# Patient Record
Sex: MALE | Race: WHITE | NOT HISPANIC OR LATINO | URBAN - METROPOLITAN AREA
[De-identification: names, ages, dates, MRNs, and addresses within clinical notes are randomized per-mention and may not be internally consistent; named-entity substitution may affect disease eponyms.]

---

## 2018-04-24 ENCOUNTER — OFFICE VISIT (OUTPATIENT)
Dept: ORTHOPEDIC SURGERY | Facility: CLINIC | Age: 43
End: 2018-04-24

## 2018-04-24 VITALS — TEMPERATURE: 98.2 F | BODY MASS INDEX: 35.78 KG/M2 | HEIGHT: 73 IN | WEIGHT: 270 LBS

## 2018-04-24 DIAGNOSIS — M1A.9XX0 CHRONIC GOUT INVOLVING TOE OF RIGHT FOOT WITHOUT TOPHUS, UNSPECIFIED CAUSE: ICD-10-CM

## 2018-04-24 DIAGNOSIS — M79.671 FOOT PAIN, RIGHT: Primary | ICD-10-CM

## 2018-04-24 DIAGNOSIS — M79.89 FOOT SWELLING: ICD-10-CM

## 2018-04-24 PROCEDURE — 99203 OFFICE O/P NEW LOW 30 MIN: CPT | Performed by: ORTHOPAEDIC SURGERY

## 2018-04-24 PROCEDURE — 73630 X-RAY EXAM OF FOOT: CPT | Performed by: ORTHOPAEDIC SURGERY

## 2018-04-24 NOTE — PROGRESS NOTES
New Right foot      Patient: Gianni Peters        YOB: 1975        Chief Complaints:right foot pain  Chief Complaint   Patient presents with   • Right Foot - Pain, Establish Care         History of Present Illness:   This is a 43-year-old male presents complaining of right foot pain he states 2 years ago had a work injury were pouch at ran over his toes hyper extending his toes he states that did resolve however periodically he will get significant amount of swelling at the first MTP joint happens about every 6 months to respond to steroid states he return one month sig month ago but has resolved.  He cannot determine what brings this on his symptoms when it occurs are moderate stabbing aching he is a  past medical history is remarkable for psoriasis and testicular cancer  HPI      Allergies: No Known Allergies    Medications:   Home Medications:  No current outpatient prescriptions on file prior to visit.     No current facility-administered medications on file prior to visit.      Current Medications:  Scheduled Meds:  Continuous Infusions:  No current facility-administered medications for this visit.   PRN Meds:.    Past Medical History:   Diagnosis Date   • Psoriasis       History reviewed. No pertinent surgical history.     Social History     Occupational History   • Not on file.     Social History Main Topics   • Smoking status: Current Every Day Smoker   • Smokeless tobacco: Not on file   • Alcohol use No   • Drug use: Unknown   • Sexual activity: Not on file    Social History     Social History Narrative   • No narrative on file      History reviewed. No pertinent family history.          Review of Systems: 14 point review of systems are remarkable for the foot pain only the remainder are negative per the patient    Review of Systems      Physical Exam: 43 y.o. male  General Appearance:    Alert, cooperative, in no acute distress                 Vitals:    04/24/18 1609   Temp: 98.2 °F  "(36.8 °C)   Weight: 122 kg (270 lb)   Height: 185.4 cm (73\")      Patient is alert and read ×3 no acute distress appears her above-listed at height weight and age.  Affect is normal respiratory rate is normal unlabored. Heart rate regular rate rhythm, sclera, dentition and hearing are normal for the purpose of this exam.        Ortho Exam   physical exam of his right foot he does have psoriasis on the right foot he has no palpable tenderness at his first MTP joint he does have some change in contour of the joint but no obvious swelling he can extend and flex his toe 2+ distal pulses ankle exam is normal         Radiology:   AP, Lateral  And oblique of the right foot  ordered/reviewed to evaluate pain.I have no films for comparison.  These show some degenerative changes seen the first MTP joint that are what I would consider mild no other acute bony pathology        Assessment/Plan:  Recurrent right toe swelling I do think this is gout he responds well to steroids I told him this really needs to be treated medically he needs to see his primary care.  He does not have one we talked about ways to get a primary care total nurse her medications that they can treat him with that we'll decrease the recurrence of this                        "

## 2024-06-03 ENCOUNTER — TELEPHONE (OUTPATIENT)
Dept: ORTHOPEDIC SURGERY | Facility: CLINIC | Age: 49
End: 2024-06-03

## 2024-06-03 ENCOUNTER — OFFICE VISIT (OUTPATIENT)
Dept: ORTHOPEDIC SURGERY | Facility: CLINIC | Age: 49
End: 2024-06-03
Payer: COMMERCIAL

## 2024-06-03 VITALS — WEIGHT: 245.6 LBS | HEIGHT: 73 IN | BODY MASS INDEX: 32.55 KG/M2 | TEMPERATURE: 97.5 F

## 2024-06-03 DIAGNOSIS — R52 PAIN: ICD-10-CM

## 2024-06-03 DIAGNOSIS — S86.112A RUPTURE OF LEFT GASTROCNEMIUS TENDON, INITIAL ENCOUNTER: Primary | ICD-10-CM

## 2024-06-03 PROCEDURE — 99204 OFFICE O/P NEW MOD 45 MIN: CPT | Performed by: ORTHOPAEDIC SURGERY

## 2024-06-03 PROCEDURE — 73590 X-RAY EXAM OF LOWER LEG: CPT | Performed by: ORTHOPAEDIC SURGERY

## 2024-06-03 RX ORDER — FAMOTIDINE 20 MG/1
20 TABLET, FILM COATED ORAL AS NEEDED
COMMUNITY

## 2024-06-03 NOTE — TELEPHONE ENCOUNTER
THIS IS A FRIEND OF MINE. HE WAS CLIMBING A LADDER AND FELT A POP AND SEVER PAIN IN HIS CALF. HE IS HAVING A HARD TIME WALKING. IS THIS SOMETHING YOU SEE?

## 2024-06-03 NOTE — PROGRESS NOTES
"   New Patient Complaint      Patient: Gianni Peters  YOB: 1975 49 y.o. male  Medical Record Number: [id number]    Chief Complaints: I calf hurts    History of Present Illness:   Patient reports pain over the medial aspect of his left calf that began on 6/1/2024 while climbing a ladder.  He said it felt like as he had been hit with a bat.  He had pain with weightbearing with pain and swelling over the medial aspect of the calf since that time.  He has not had pain in the Achilles.       HPI    Allergies: No Known Allergies    Medications:   Current Outpatient Medications on File Prior to Visit   Medication Sig    famotidine (PEPCID) 20 MG tablet Take 1 tablet by mouth As Needed for Heartburn.    RaNITidine HCl (ZANTAC PO) Take  by mouth. (Patient not taking: Reported on 6/3/2024)     No current facility-administered medications on file prior to visit.       Past Medical History:   Diagnosis Date    Psoriasis      No past surgical history on file.  Social History     Occupational History    Not on file   Tobacco Use    Smoking status: Every Day    Smokeless tobacco: Not on file   Substance and Sexual Activity    Alcohol use: No    Drug use: Not on file    Sexual activity: Not on file      Social History     Social History Narrative    Not on file     History reviewed. No pertinent family history.    Review of Systems: 14 point review of systems performed, positive pertinent findings identified in HPI. All remaining systems negative     Review of Systems      Physical Exam:   Vitals:    06/03/24 1314   Temp: 97.5 °F (36.4 °C)   Weight: 111 kg (245 lb 9.6 oz)   Height: 185.4 cm (73\")   PainSc:   5     Physical Exam   Constitutional: pleasant, well developed   Eyes: sclera non icteric  Hearing : adequate for exam  Cardiovascular: palpable pulses in left foot, left calf/ thigh NT without sign of DVT  Respiratoy: breathing unlabored   Neurological: grossly sensate to LT throughout left LE  Psychiatric: " oriented with normal mood and affect.   Lymphatic: No palpable popliteal lymphadenopathy left LE  Skin: intact throughout left leg/foot  Musculoskeletal: Exam he has mild to moderate swelling about the left medial calf with tenderness palpation no palpable fascial defect.  He had good plantarflexion strength but with some discomfort at the calf but no pain or palpable defect at the Achilles.  I was able to achieve neutral dorsiflexion  Physical Exam  Ortho Exam    Radiology: 2 views left tib-fib ordered evaluate pain reviewed and no prior x-rays available for comparison I do not see any obvious fracture in the distal portion of the knee and most proximal portion of the ankle is partially visualized with no obvious fractures.    Assessment/Plan: Left calf pain consistent with medial gastroc rupture    Reviewed with him I am not recommending any further workup or surgical treatment at this time.    He was fitted with a boot with a double heel lift and may weight-bear as tolerated on this but recommended offloading at least with crutches for a week or so.  In 1 week to 10 days he can start removing one of the heel lifts and begin some gentle heel cord stretching exercises.  In the interim he will do some towel stretch prior to first up in the morning and will keep him off work where he works as a  and has to get out of the truck and climb up over to place a tarp intermittently..  He was prescribed compounding cream to apply the area several times daily    I will see him back in

## 2024-06-06 ENCOUNTER — PATIENT ROUNDING (BHMG ONLY) (OUTPATIENT)
Dept: ORTHOPEDIC SURGERY | Facility: CLINIC | Age: 49
End: 2024-06-06
Payer: COMMERCIAL

## 2024-06-06 NOTE — PROGRESS NOTES
A [facility] Message has been sent to the patient [place] [facility] [place]

## 2024-06-19 ENCOUNTER — OFFICE VISIT (OUTPATIENT)
Dept: ORTHOPEDIC SURGERY | Facility: CLINIC | Age: 49
End: 2024-06-19
Payer: COMMERCIAL

## 2024-06-19 VITALS — HEIGHT: 73 IN | TEMPERATURE: 97.3 F | WEIGHT: 252 LBS | BODY MASS INDEX: 33.4 KG/M2

## 2024-06-19 DIAGNOSIS — S86.112D RUPTURE OF LEFT GASTROCNEMIUS TENDON, SUBSEQUENT ENCOUNTER: Primary | ICD-10-CM
